# Patient Record
Sex: FEMALE | Race: WHITE | NOT HISPANIC OR LATINO | Employment: FULL TIME | URBAN - METROPOLITAN AREA
[De-identification: names, ages, dates, MRNs, and addresses within clinical notes are randomized per-mention and may not be internally consistent; named-entity substitution may affect disease eponyms.]

---

## 2025-07-09 ENCOUNTER — OFFICE VISIT (OUTPATIENT)
Dept: PRIMARY CARE | Facility: CLINIC | Age: 40
End: 2025-07-09
Payer: COMMERCIAL

## 2025-07-09 VITALS
WEIGHT: 293 LBS | RESPIRATION RATE: 18 BRPM | BODY MASS INDEX: 46.36 KG/M2 | TEMPERATURE: 98.4 F | HEART RATE: 63 BPM | DIASTOLIC BLOOD PRESSURE: 83 MMHG | OXYGEN SATURATION: 97 % | SYSTOLIC BLOOD PRESSURE: 137 MMHG

## 2025-07-09 DIAGNOSIS — F41.8 ANXIETY WITH DEPRESSION: ICD-10-CM

## 2025-07-09 DIAGNOSIS — Z91.09 MULTIPLE ENVIRONMENTAL ALLERGIES: ICD-10-CM

## 2025-07-09 DIAGNOSIS — F42.4 SKIN PICKING HABIT: ICD-10-CM

## 2025-07-09 DIAGNOSIS — Z00.00 ANNUAL PHYSICAL EXAM: Primary | ICD-10-CM

## 2025-07-09 PROBLEM — M19.91 LOCALIZED, PRIMARY OSTEOARTHRITIS: Status: ACTIVE | Noted: 2019-12-10

## 2025-07-09 PROBLEM — D49.89 ANTERIOR MEDIASTINAL TUMOR: Status: ACTIVE | Noted: 2025-07-09

## 2025-07-09 PROBLEM — Z98.890: Status: ACTIVE | Noted: 2017-11-30

## 2025-07-09 PROBLEM — O09.293: Status: ACTIVE | Noted: 2017-11-30

## 2025-07-09 PROBLEM — J98.59 MEDIASTINAL MASS: Status: ACTIVE | Noted: 2019-10-01

## 2025-07-09 PROCEDURE — 99204 OFFICE O/P NEW MOD 45 MIN: CPT | Performed by: FAMILY MEDICINE

## 2025-07-09 PROCEDURE — 1036F TOBACCO NON-USER: CPT | Performed by: FAMILY MEDICINE

## 2025-07-09 RX ORDER — FEXOFENADINE HCL 60 MG/1
60 TABLET, FILM COATED ORAL DAILY
Qty: 30 TABLET | Refills: 11 | Status: SHIPPED | OUTPATIENT
Start: 2025-07-09 | End: 2026-07-09

## 2025-07-09 RX ORDER — VENLAFAXINE HYDROCHLORIDE 37.5 MG/1
37.5 CAPSULE, EXTENDED RELEASE ORAL DAILY
Qty: 30 CAPSULE | Refills: 1 | Status: SHIPPED | OUTPATIENT
Start: 2025-07-09 | End: 2025-09-07

## 2025-07-09 ASSESSMENT — PATIENT HEALTH QUESTIONNAIRE - PHQ9
2. FEELING DOWN, DEPRESSED OR HOPELESS: NOT AT ALL
1. LITTLE INTEREST OR PLEASURE IN DOING THINGS: NOT AT ALL
SUM OF ALL RESPONSES TO PHQ9 QUESTIONS 1 AND 2: 0

## 2025-07-09 NOTE — PROGRESS NOTES
Subjective   Patient ID: Chrissie Mcdaniel is a 40 y.o. female who presents for New Patient Visit and Annual Exam (Referrals).    HPI     Allergies : itchy watery nose and eyes  2.    Anxiety with depression/fear of public places/skin picking  # children=3 ( A1)- 13,9 and 7  Works : [employer]: works from home  3.  Smoker: quit 3 weeks ago.        Review of Systems   All other systems reviewed and are negative.      Objective   /83 (BP Location: Left arm, Patient Position: Sitting, BP Cuff Size: Large adult)   Pulse 63   Temp 36.9 °C (98.4 °F) (Temporal)   Resp 18   Wt 134 kg (296 lb)   SpO2 97%   BMI 46.36 kg/m²     Physical Exam  Vitals and nursing note reviewed.   Cardiovascular:      Rate and Rhythm: Normal rate and regular rhythm.   Pulmonary:      Effort: Pulmonary effort is normal.      Breath sounds: Normal breath sounds.   Musculoskeletal:      Cervical back: Neck supple.   Lymphadenopathy:      Cervical: No cervical adenopathy.   Neurological:      Mental Status: She is alert.   Psychiatric:         Mood and Affect: Mood normal.         Behavior: Behavior normal.         Thought Content: Thought content normal.         Judgment: Judgment normal.         Assessment/Plan   Diagnoses and all orders for this visit:  Annual physical exam  -     BI mammo bilateral screening tomosynthesis; Future  -     Comprehensive Metabolic Panel; Future  -     CBC and Auto Differential; Future  -     Lipid panel; Future  -     Hemoglobin A1c; Future  -     TSH; Future  -     Vitamin D 25-Hydroxy,Total (for eval of Vitamin D levels); Future  -     Referral to Gynecology; Future  Anxiety with depression  -     Referral to Psychology; Future  -     venlafaxine XR (Effexor-XR) 37.5 mg 24 hr capsule; Take 1 capsule (37.5 mg) by mouth once daily. Do not crush or chew.  Multiple environmental allergies  -     fexofenadine (Allegra) 60 mg tablet; Take 1 tablet (60 mg) by mouth once daily.  Skin picking habit  -      venlafaxine XR (Effexor-XR) 37.5 mg 24 hr capsule; Take 1 capsule (37.5 mg) by mouth once daily. Do not crush or chew.  Other orders  -     Follow Up In Primary Care - Established; Future

## 2025-10-08 ENCOUNTER — APPOINTMENT (OUTPATIENT)
Dept: PRIMARY CARE | Facility: CLINIC | Age: 40
End: 2025-10-08
Payer: COMMERCIAL